# Patient Record
Sex: MALE | Race: WHITE | NOT HISPANIC OR LATINO | URBAN - METROPOLITAN AREA
[De-identification: names, ages, dates, MRNs, and addresses within clinical notes are randomized per-mention and may not be internally consistent; named-entity substitution may affect disease eponyms.]

---

## 2019-01-07 ENCOUNTER — CONVERSION ENCOUNTER (OUTPATIENT)
Dept: NEUROLOGY | Facility: CLINIC | Age: 54
End: 2019-01-07

## 2019-01-07 ENCOUNTER — OFFICE VISIT CONVERTED (OUTPATIENT)
Dept: NEUROSURGERY | Facility: CLINIC | Age: 54
End: 2019-01-07
Attending: NEUROLOGICAL SURGERY

## 2019-02-04 ENCOUNTER — OFFICE VISIT CONVERTED (OUTPATIENT)
Dept: NEUROSURGERY | Facility: CLINIC | Age: 54
End: 2019-02-04
Attending: NEUROLOGICAL SURGERY

## 2021-05-16 VITALS
SYSTOLIC BLOOD PRESSURE: 139 MMHG | DIASTOLIC BLOOD PRESSURE: 80 MMHG | HEIGHT: 70 IN | HEART RATE: 83 BPM | BODY MASS INDEX: 28.71 KG/M2 | WEIGHT: 200.56 LBS

## 2021-05-16 VITALS — SYSTOLIC BLOOD PRESSURE: 124 MMHG | WEIGHT: 206.06 LBS | DIASTOLIC BLOOD PRESSURE: 73 MMHG

## 2023-12-22 ENCOUNTER — TRANSCRIBE ORDERS (OUTPATIENT)
Dept: GASTROENTEROLOGY | Facility: CLINIC | Age: 58
End: 2023-12-22

## 2023-12-22 ENCOUNTER — TELEPHONE (OUTPATIENT)
Dept: GASTROENTEROLOGY | Facility: CLINIC | Age: 58
End: 2023-12-22

## 2023-12-22 NOTE — TELEPHONE ENCOUNTER
WIFE CALL CONCERNING AN URGENT REFERRAL.    REFERRAL WAS UPLOADED INTO PATIENT CHART    WIFE WILL BE BRINGING IN LAB RESULTS TO THE OFFICE ON TUESDAY 12.26.23 FOR REVIEW AND SCHEDULING    RECORDS REQUEST HAS BEEN FAXED TO DR SCOTT WHO IS PATIENT'S PRIMARY DOCTOR.

## 2024-01-12 ENCOUNTER — OFFICE VISIT (OUTPATIENT)
Dept: GASTROENTEROLOGY | Facility: CLINIC | Age: 59
End: 2024-01-12
Payer: OTHER GOVERNMENT

## 2024-01-12 ENCOUNTER — LAB (OUTPATIENT)
Dept: LAB | Facility: HOSPITAL | Age: 59
End: 2024-01-12
Payer: OTHER GOVERNMENT

## 2024-01-12 VITALS
BODY MASS INDEX: 30.35 KG/M2 | WEIGHT: 212 LBS | OXYGEN SATURATION: 97 % | HEIGHT: 70 IN | DIASTOLIC BLOOD PRESSURE: 89 MMHG | HEART RATE: 81 BPM | SYSTOLIC BLOOD PRESSURE: 131 MMHG

## 2024-01-12 DIAGNOSIS — E66.9 CLASS 1 OBESITY WITH BODY MASS INDEX (BMI) OF 30.0 TO 30.9 IN ADULT, UNSPECIFIED OBESITY TYPE, UNSPECIFIED WHETHER SERIOUS COMORBIDITY PRESENT: ICD-10-CM

## 2024-01-12 DIAGNOSIS — R74.8 ELEVATED LIVER ENZYMES: Primary | ICD-10-CM

## 2024-01-12 DIAGNOSIS — R74.8 ELEVATED LIVER ENZYMES: ICD-10-CM

## 2024-01-12 LAB
BASOPHILS # BLD AUTO: 0.07 10*3/MM3 (ref 0–0.2)
BASOPHILS NFR BLD AUTO: 0.7 % (ref 0–1.5)
CERULOPLASMIN SERPL-MCNC: 12 MG/DL (ref 16–31)
DEPRECATED RDW RBC AUTO: 43.1 FL (ref 37–54)
EOSINOPHIL # BLD AUTO: 0.16 10*3/MM3 (ref 0–0.4)
EOSINOPHIL NFR BLD AUTO: 1.6 % (ref 0.3–6.2)
ERYTHROCYTE [DISTWIDTH] IN BLOOD BY AUTOMATED COUNT: 13 % (ref 12.3–15.4)
FERRITIN SERPL-MCNC: 261 NG/ML (ref 30–400)
HAV IGM SERPL QL IA: NORMAL
HBV CORE IGM SERPL QL IA: NORMAL
HBV SURFACE AG SERPL QL IA: NORMAL
HCT VFR BLD AUTO: 46.9 % (ref 37.5–51)
HCV AB SER DONR QL: NORMAL
HGB BLD-MCNC: 16.1 G/DL (ref 13–17.7)
IMM GRANULOCYTES # BLD AUTO: 0.18 10*3/MM3 (ref 0–0.05)
IMM GRANULOCYTES NFR BLD AUTO: 1.8 % (ref 0–0.5)
INR PPP: 0.9 (ref 0.86–1.15)
IRON 24H UR-MRATE: 104 MCG/DL (ref 59–158)
IRON SATN MFR SERPL: 31 % (ref 20–50)
LYMPHOCYTES # BLD AUTO: 3.57 10*3/MM3 (ref 0.7–3.1)
LYMPHOCYTES NFR BLD AUTO: 36.2 % (ref 19.6–45.3)
MCH RBC QN AUTO: 31.3 PG (ref 26.6–33)
MCHC RBC AUTO-ENTMCNC: 34.3 G/DL (ref 31.5–35.7)
MCV RBC AUTO: 91.2 FL (ref 79–97)
MONOCYTES # BLD AUTO: 0.72 10*3/MM3 (ref 0.1–0.9)
MONOCYTES NFR BLD AUTO: 7.3 % (ref 5–12)
NEUTROPHILS NFR BLD AUTO: 5.17 10*3/MM3 (ref 1.7–7)
NEUTROPHILS NFR BLD AUTO: 52.4 % (ref 42.7–76)
NRBC BLD AUTO-RTO: 0 /100 WBC (ref 0–0.2)
PLATELET # BLD AUTO: 365 10*3/MM3 (ref 140–450)
PMV BLD AUTO: 10.1 FL (ref 6–12)
PROTHROMBIN TIME: 12.3 SECONDS (ref 11.8–14.9)
RBC # BLD AUTO: 5.14 10*6/MM3 (ref 4.14–5.8)
TIBC SERPL-MCNC: 331 MCG/DL (ref 298–536)
TRANSFERRIN SERPL-MCNC: 222 MG/DL (ref 200–360)
WBC NRBC COR # BLD AUTO: 9.87 10*3/MM3 (ref 3.4–10.8)

## 2024-01-12 PROCEDURE — 82390 ASSAY OF CERULOPLASMIN: CPT

## 2024-01-12 PROCEDURE — 86015 ACTIN ANTIBODY EACH: CPT

## 2024-01-12 PROCEDURE — 80053 COMPREHEN METABOLIC PANEL: CPT

## 2024-01-12 PROCEDURE — 85610 PROTHROMBIN TIME: CPT

## 2024-01-12 PROCEDURE — 83540 ASSAY OF IRON: CPT

## 2024-01-12 PROCEDURE — 86381 MITOCHONDRIAL ANTIBODY EACH: CPT

## 2024-01-12 PROCEDURE — 82728 ASSAY OF FERRITIN: CPT

## 2024-01-12 PROCEDURE — 82103 ALPHA-1-ANTITRYPSIN TOTAL: CPT

## 2024-01-12 PROCEDURE — 84466 ASSAY OF TRANSFERRIN: CPT

## 2024-01-12 PROCEDURE — 82104 ALPHA-1-ANTITRYPSIN PHENO: CPT

## 2024-01-12 PROCEDURE — 85025 COMPLETE CBC W/AUTO DIFF WBC: CPT

## 2024-01-12 PROCEDURE — 80074 ACUTE HEPATITIS PANEL: CPT

## 2024-01-12 PROCEDURE — 36415 COLL VENOUS BLD VENIPUNCTURE: CPT

## 2024-01-12 PROCEDURE — 86038 ANTINUCLEAR ANTIBODIES: CPT

## 2024-01-12 RX ORDER — LEVOTHYROXINE SODIUM 13 UG/1
CAPSULE ORAL
COMMUNITY

## 2024-01-12 RX ORDER — LIDOCAINE 50 MG/G
PATCH TOPICAL
COMMUNITY

## 2024-01-12 RX ORDER — MAG HYDROX/ALUMINUM HYD/SIMETH 400-400-40
SUSPENSION, ORAL (FINAL DOSE FORM) ORAL
COMMUNITY

## 2024-01-12 RX ORDER — UBIDECARENONE 75 MG
50 CAPSULE ORAL DAILY
COMMUNITY

## 2024-01-13 LAB
ALBUMIN SERPL-MCNC: 4.3 G/DL (ref 3.5–5.2)
ALBUMIN/GLOB SERPL: 1.6 G/DL
ALP SERPL-CCNC: 72 U/L (ref 39–117)
ALT SERPL W P-5'-P-CCNC: 52 U/L (ref 1–41)
ANION GAP SERPL CALCULATED.3IONS-SCNC: 12.1 MMOL/L (ref 5–15)
AST SERPL-CCNC: 29 U/L (ref 1–40)
BILIRUB SERPL-MCNC: 0.2 MG/DL (ref 0–1.2)
BUN SERPL-MCNC: 17 MG/DL (ref 6–20)
BUN/CREAT SERPL: 15.6 (ref 7–25)
CALCIUM SPEC-SCNC: 9.2 MG/DL (ref 8.6–10.5)
CHLORIDE SERPL-SCNC: 103 MMOL/L (ref 98–107)
CO2 SERPL-SCNC: 22.9 MMOL/L (ref 22–29)
CREAT SERPL-MCNC: 1.09 MG/DL (ref 0.76–1.27)
EGFRCR SERPLBLD CKD-EPI 2021: 78.2 ML/MIN/1.73
GLOBULIN UR ELPH-MCNC: 2.7 GM/DL
GLUCOSE SERPL-MCNC: 81 MG/DL (ref 65–99)
POTASSIUM SERPL-SCNC: 4.4 MMOL/L (ref 3.5–5.2)
PROT SERPL-MCNC: 7 G/DL (ref 6–8.5)
SODIUM SERPL-SCNC: 138 MMOL/L (ref 136–145)

## 2024-01-15 ENCOUNTER — TELEPHONE (OUTPATIENT)
Dept: GASTROENTEROLOGY | Facility: CLINIC | Age: 59
End: 2024-01-15
Payer: OTHER GOVERNMENT

## 2024-01-15 ENCOUNTER — PATIENT ROUNDING (BHMG ONLY) (OUTPATIENT)
Dept: GASTROENTEROLOGY | Facility: CLINIC | Age: 59
End: 2024-01-15
Payer: OTHER GOVERNMENT

## 2024-01-15 LAB
ANA SER QL: NEGATIVE
MITOCHONDRIA M2 IGG SER-ACNC: <20 UNITS (ref 0–20)
SMA IGG SER-ACNC: 9 UNITS (ref 0–19)

## 2024-01-15 NOTE — PROGRESS NOTES
1/15/2024      Hello, may I speak with Reddy Tay     My name is Francisco. I am calling from [facility]. I show that you had a recent visit with CHESTER Olson.    Before we get started may I verify your date of birth? 1965    I am calling to officially welcome you to our practice and ask about your recent visit. Is this a good time to talk? No, I left patient a voicemail.     Tell me about your visit with us. What things went well?    We strive to ensure that we protect your safety and privacy. Is there anything we could have done to improve this during your visit?        We're always looking for ways to make our patients' experiences even better. Do you have recommendations on ways we may improve?    Overall were you satisfied with your first visit to our practice?    I appreciate you taking the time to speak with me today. Is there anything else I can do for you?    I am glad to hear that you had a very good visit and I appreciate you taking the time to provide feedback on this call. We would greatly appreciate you filling out a survey if you receive one in the mail, email or text. This is a great opportunity to provide any additional feedback that you may think of after this call as well.       Thank you, and have a great day.

## 2024-01-15 NOTE — TELEPHONE ENCOUNTER
Spoke with pt. Notified of results. Voiced understanding.  Asked lab results. [facility].  Will fax results as requested. [email or web address]

## 2024-01-15 NOTE — TELEPHONE ENCOUNTER
----- Message from CHESTER Olson sent at 1/15/2024  9:18 AM EST -----  Glucose normal.  Kidney function normal.  Electrolytes normal.  Liver enzymes display mild elevation in the patient's ALT at 52 normal level is 41.  AST is normal.  Ceruloplasmin is low which can mean that there is too much copper in the blood I would like the patient to complete a 24-hour urine collection for copper and a blood drawn copper level.  Normal CBC with no anemia noted.  Platelets are normal.  Iron normal.  Ferritin normal.  Hepatitis panel negative.  PT/INR normal.  Awaiting remaining labs.

## 2024-01-16 ENCOUNTER — TELEPHONE (OUTPATIENT)
Dept: GASTROENTEROLOGY | Facility: CLINIC | Age: 59
End: 2024-01-16
Payer: OTHER GOVERNMENT

## 2024-01-16 NOTE — TELEPHONE ENCOUNTER
----- Message from CHESTER Olson sent at 1/15/2024  2:27 PM EST -----  Normal smooth muscle and AMA   I have reviewed and agree with the initial nursing note, except as documented in my note.

## 2024-01-17 ENCOUNTER — LAB (OUTPATIENT)
Dept: LAB | Facility: HOSPITAL | Age: 59
End: 2024-01-17
Payer: OTHER GOVERNMENT

## 2024-01-17 DIAGNOSIS — R74.8 ELEVATED LIVER ENZYMES: ICD-10-CM

## 2024-01-17 DIAGNOSIS — E66.9 CLASS 1 OBESITY WITH BODY MASS INDEX (BMI) OF 30.0 TO 30.9 IN ADULT, UNSPECIFIED OBESITY TYPE, UNSPECIFIED WHETHER SERIOUS COMORBIDITY PRESENT: ICD-10-CM

## 2024-01-17 PROCEDURE — 36415 COLL VENOUS BLD VENIPUNCTURE: CPT

## 2024-01-17 PROCEDURE — 82525 ASSAY OF COPPER: CPT

## 2024-01-18 LAB
A1AT PHENOTYP SERPL IFE: NORMAL
A1AT SERPL-MCNC: 118 MG/DL (ref 101–187)

## 2024-01-19 ENCOUNTER — LAB (OUTPATIENT)
Dept: LAB | Facility: HOSPITAL | Age: 59
End: 2024-01-19
Payer: OTHER GOVERNMENT

## 2024-01-19 PROCEDURE — 81050 URINALYSIS VOLUME MEASURE: CPT

## 2024-01-19 PROCEDURE — 82570 ASSAY OF URINE CREATININE: CPT

## 2024-01-19 PROCEDURE — 82525 ASSAY OF COPPER: CPT

## 2024-01-20 LAB — COPPER SERPL-MCNC: 50 UG/DL (ref 69–132)

## 2024-01-24 LAB
COPPER 24H UR-MRATE: 21 UG/24 HR (ref 3–35)
COPPER UR-MCNC: 15 UG/L
COPPER/CREAT UR: 11 UG/G CREAT (ref 0–49)
CREAT UR-MCNC: 1.38 G/L (ref 0.3–3)

## 2024-01-26 ENCOUNTER — HOSPITAL ENCOUNTER (OUTPATIENT)
Dept: ULTRASOUND IMAGING | Facility: HOSPITAL | Age: 59
Discharge: HOME | End: 2024-01-26
Admitting: NURSE PRACTITIONER
Payer: OTHER GOVERNMENT

## 2024-01-26 DIAGNOSIS — R74.8 ELEVATED LIVER ENZYMES: ICD-10-CM

## 2024-01-26 DIAGNOSIS — E66.9 CLASS 1 OBESITY WITH BODY MASS INDEX (BMI) OF 30.0 TO 30.9 IN ADULT, UNSPECIFIED OBESITY TYPE, UNSPECIFIED WHETHER SERIOUS COMORBIDITY PRESENT: ICD-10-CM

## 2024-01-26 PROCEDURE — 76705 ECHO EXAM OF ABDOMEN: CPT

## 2024-04-05 ENCOUNTER — TELEPHONE (OUTPATIENT)
Dept: GASTROENTEROLOGY | Facility: CLINIC | Age: 59
End: 2024-04-05
Payer: OTHER GOVERNMENT

## 2024-04-05 NOTE — TELEPHONE ENCOUNTER
"Patient is scheduled for a Fibroscan to be completed on 04/12/2024 at [facility].  A [facility] authorization was submitted for this testing, it has been approved however \"PATIENT HAS BEEN ACCEPTED AT THE  TREATMENT FACILITY. THE [facility] WILL CONTACT THE PATIENT TO SCHEDULED AN APPOINTMENT\"    Per speak with insurance, [place] and [facility] this is [facility] to be completed at a  site, [facility] [place] [facility]. Patient will need to either set this up with the [facility] or contact their insurance about being released to complete testing at [facility].   "

## 2024-04-08 NOTE — TELEPHONE ENCOUNTER
I have submitted a change in rendering provider on [facility] as this procedure is unavailable at [facility]. Awaiting  update.

## 2024-04-09 ENCOUNTER — TELEPHONE (OUTPATIENT)
Dept: GASTROENTEROLOGY | Facility: CLINIC | Age: 59
End: 2024-04-09
Payer: OTHER GOVERNMENT

## 2024-04-09 NOTE — TELEPHONE ENCOUNTER
Patient is waiting to see if he can get his Fibro Scan approved before he cancel it before Friday on 4/12/24. He will call to cancel the appointment, if it isn't approved tomorrow..

## 2024-04-10 NOTE — TELEPHONE ENCOUNTER
Contacted patient spouse to make them aware that [facility] had approved the referral for the Fibroscan. They were advised of this update yesterday the details are scanned into the chart and patient is aware.

## 2024-04-12 ENCOUNTER — PROCEDURE VISIT (OUTPATIENT)
Dept: OTHER | Facility: HOSPITAL | Age: 59
End: 2024-04-12
Payer: OTHER GOVERNMENT

## 2024-04-12 DIAGNOSIS — E66.9 CLASS 1 OBESITY WITH BODY MASS INDEX (BMI) OF 30.0 TO 30.9 IN ADULT, UNSPECIFIED OBESITY TYPE, UNSPECIFIED WHETHER SERIOUS COMORBIDITY PRESENT: ICD-10-CM

## 2024-04-12 DIAGNOSIS — R74.8 ELEVATED LIVER ENZYMES: ICD-10-CM

## 2024-04-12 PROCEDURE — 91200 LIVER ELASTOGRAPHY: CPT | Performed by: NURSE PRACTITIONER

## 2024-04-16 ENCOUNTER — TELEPHONE (OUTPATIENT)
Dept: GASTROENTEROLOGY | Facility: CLINIC | Age: 59
End: 2024-04-16
Payer: OTHER GOVERNMENT

## 2024-04-16 NOTE — TELEPHONE ENCOUNTER
----- Message from CHESTER June sent at 4/16/2024  1:19 PM EDT -----  I have reviewed the patient's most recent FibroScan.  The patient's liver stiffness score is consistent with F0-1, this is on a scale of F0 being normal and F4 consistent with cirrhosis. Fibroscan also indicates moderate/severe fatty infiltration of the liver. Continue previously discussed recommendations of dietary modifications and weight loss.

## 2024-04-16 NOTE — PROGRESS NOTES
Liver Elastography    Performed by: Kaya Raines APRN  Authorized by: Unique Mcguire APRN  Ordering Provider: Unique Mcguire APRN    Probe:  M+  Procedure Details:  Procedure: After providing an oral and written explanation of the Fibroscan vibration controlled transient elastography (VTCE) test procedure to the patient. The patient was placed in supine position with right arm in maximum abduction to allow optimal exposure of right lateral abdomen. Patient was briefly assessed, identifying terminus of the xyphoid process and locating an ideal transient elastography testing site, midline and lateral to this point. Patient was instructed to breathe normally and remain stationary during the test process. Pre-measurement data confirmed the transient elastography probe was centered over the liver parenchyma. A series of ten 50 Hz mechanical pulses were applied with controlled application pressure to induce a mechanical shear wave in the liver tissue. For each measurement, the shear wave propagation speed was detected, displayed and converted to its equivalent liver stiffness value in kilopascals. Skin to liver capsules distance and shear wave characteristics were monitored during the entire examination to assure quality data. Median liver stiffness measurement and interquartile range were calculated and displayed in real time. Acquired measurement data was stored and submitted to the provider for review and interpretation. Patient tolerated the procedure well and was discharged without incident.   Clinical Information:     NPO 3 Hours or More: Yes      Actively Drinking: No    Findings:     Median Liver Stiffness Score:  5.5    Interquartile Range (IQR) to Median Ratio:  8    Libra Stiffness Consistent with:  F0-F1    Current Scan Considered Reliab: Yes      Median Controlled Attenuation Parameter (dB/m):  335    IQR:  14    CAP SCORE:  Moderate/severe liver fat

## 2025-04-17 ENCOUNTER — TRANSCRIBE ORDERS (OUTPATIENT)
Dept: ADMINISTRATIVE | Facility: HOSPITAL | Age: 60
End: 2025-04-17
Payer: OTHER GOVERNMENT

## 2025-04-17 DIAGNOSIS — M54.2 CERVICALGIA: ICD-10-CM

## 2025-04-17 DIAGNOSIS — M47.812 CERVICAL SPONDYLOSIS: Primary | ICD-10-CM

## 2025-04-17 DIAGNOSIS — G89.29 OTHER CHRONIC PAIN: ICD-10-CM

## 2025-04-22 ENCOUNTER — HOSPITAL ENCOUNTER (OUTPATIENT)
Dept: MRI IMAGING | Facility: HOSPITAL | Age: 60
Discharge: HOME | End: 2025-04-22
Admitting: FAMILY MEDICINE
Payer: OTHER GOVERNMENT

## 2025-04-22 DIAGNOSIS — M47.812 CERVICAL SPONDYLOSIS: ICD-10-CM

## 2025-04-22 DIAGNOSIS — G89.29 OTHER CHRONIC PAIN: ICD-10-CM

## 2025-04-22 DIAGNOSIS — M54.2 CERVICALGIA: ICD-10-CM

## 2025-04-22 PROCEDURE — 72141 MRI NECK SPINE W/O DYE: CPT

## 2025-05-01 ENCOUNTER — OFFICE VISIT (OUTPATIENT)
Dept: NEUROSURGERY | Facility: CLINIC | Age: 60
End: 2025-05-01
Payer: OTHER GOVERNMENT

## 2025-05-01 VITALS
DIASTOLIC BLOOD PRESSURE: 87 MMHG | BODY MASS INDEX: 29.65 KG/M2 | SYSTOLIC BLOOD PRESSURE: 128 MMHG | WEIGHT: 207.1 LBS | HEIGHT: 70 IN

## 2025-05-01 DIAGNOSIS — M50.223 HERNIATED NUCLEUS PULPOSUS, C6-7: Primary | ICD-10-CM

## 2025-05-01 DIAGNOSIS — M48.02 NEURAL FORAMINAL STENOSIS OF CERVICAL SPINE: ICD-10-CM

## 2025-05-01 DIAGNOSIS — M47.812 FACET ARTHRITIS OF CERVICAL REGION: ICD-10-CM

## 2025-05-01 RX ORDER — VARENICLINE TARTRATE 0.5 MG/1
0.5 TABLET, FILM COATED ORAL 2 TIMES DAILY
COMMUNITY

## 2025-05-01 NOTE — PROGRESS NOTES
"Chief Complaint  Neck Pain    Subjective            Reddy Tay who is a 59 y.o. year old male who presents to [facility] for Evaluation of the Spine.     History of Present Illness  The patient is a 59-year-old male who presents for evaluation of neck pain.    He was previously evaluated in 2018 for issues related to his C6-C7 vertebrae, which have progressively worsened. He reports that the injections administered since his last visit have ceased to be effective. During his most recent visits, Dr. Reaves encountered difficulties in needle insertion, resulting in significant discomfort. His current pain level is approximately 5 out of 10, with less radiation into his arm compared to the period prior to 2018 when the injections were initiated. He experiences numbness in his left index and pinky fingers, which are currently slightly numb. He also reports a constant nagging pain when attempting to move his neck to the left. He has been utilizing nicotine but is actively attempting cessation. He has been off cigarettes since December 2024. He has previously undergone physical therapy, which provided some relief. He is currently on Chantix, which he tolerates well. He also reports a torn rotator cuff, which contributes to his muscular pain. He receives trigger point injections to maintain muscle relaxation, as they tend to tense up by the end of the day.    SOCIAL HISTORY  The patient admits to currently using nicotine and is actively working on quitting with the help of Chantix.    MEDICATIONS  Current: Chantix    This patient  reports that he has been smoking cigarettes. He has been exposed to tobacco smoke. He has never used smokeless tobacco.    Review of Systems   Musculoskeletal:  Positive for neck pain.        Objective   Vital Signs:   /87 (BP Location: Right arm, Patient Position: Sitting)   Ht 177.8 cm (70\")   Wt 93.9 kg (207 lb 1.6 oz)   BMI " 29.72 kg/m²       Physical Exam  Constitutional:       Appearance: He is normal weight.   Pulmonary:      Effort: Pulmonary effort is normal.   Neurological:      Mental Status: He is alert.      Sensory: No sensory deficit.      Motor: No weakness.      Deep Tendon Reflexes: Reflexes normal (neg Hoffmans).   Psychiatric:         Mood and Affect: Mood normal.          Result Review :   I personally interpreted the patient's MRI scan with the patient.    Results  Imaging  MRI from 04/22/2025 shows small bulges at C4-5 and C5-6, and a larger bulge at C6-7. Mild foraminal stenosis on the right worse than the left at C4-5, mild left foraminal stenosis at C5-6, and moderate foraminal narrowing on both sides at C6-7. MRI from 2012 shows posterior bulge and mild left foraminal narrowing at C5-6, and diffuse bulge and bilateral foraminal narrowing at C6-7, right worse than left.       Assessment and Plan    Diagnoses and all orders for this visit:    1. Herniated nucleus pulposus, C6-7 (Primary)    2. Neural foraminal stenosis of cervical spine    3. Facet arthritis of cervical region      Assessment & Plan  1. Cervicalgia.  The patient's condition has shown a slight deterioration since 2012, with the development of multilevel degenerative arthritis. However, there is no evidence of spinal cord compression. The primary cause of his neck pain appears to be a combination of factors rather than a single issue. The possibility of a three-level fusion was discussed, but it was emphasized that this is not a minor procedure. The success rate of such a procedure is approximately 50 percent for neck pain and significantly higher for arm pain. It was also noted that the foraminal narrowing is unlikely to improve over time. He was advised to abstain from nicotine use if he is considering a three-level fusion due to its potential impact on the success rate of the procedure.    He could potentially benefit from cervical facet  block/ablation.      Follow Up   No follow-ups on file.  Patient was given instructions and counseling regarding his condition or for health maintenance advice. Please see specific information pulled into the AVS if appropriate.     Patient or patient representative verbalized consent for the use of Ambient Listening during the visit with  Jonah Reaves MD for chart documentation. 5/1/2025  13:18 EDT